# Patient Record
Sex: FEMALE | Race: WHITE | NOT HISPANIC OR LATINO | URBAN - METROPOLITAN AREA
[De-identification: names, ages, dates, MRNs, and addresses within clinical notes are randomized per-mention and may not be internally consistent; named-entity substitution may affect disease eponyms.]

---

## 2025-05-24 ENCOUNTER — OFFICE VISIT (OUTPATIENT)
Dept: URGENT CARE | Age: 45
End: 2025-05-24
Payer: COMMERCIAL

## 2025-05-24 VITALS
WEIGHT: 153 LBS | OXYGEN SATURATION: 98 % | TEMPERATURE: 97.9 F | SYSTOLIC BLOOD PRESSURE: 126 MMHG | DIASTOLIC BLOOD PRESSURE: 83 MMHG | RESPIRATION RATE: 18 BRPM | HEART RATE: 77 BPM

## 2025-05-24 DIAGNOSIS — R30.0 DYSURIA: ICD-10-CM

## 2025-05-24 DIAGNOSIS — R31.29 OTHER MICROSCOPIC HEMATURIA: Primary | ICD-10-CM

## 2025-05-24 LAB
POC APPEARANCE, URINE: CLEAR
POC BILIRUBIN, URINE: NEGATIVE
POC BLOOD, URINE: ABNORMAL
POC COLOR, URINE: YELLOW
POC GLUCOSE, URINE: NEGATIVE MG/DL
POC KETONES, URINE: NEGATIVE MG/DL
POC LEUKOCYTES, URINE: NEGATIVE
POC NITRITE,URINE: NEGATIVE
POC PH, URINE: 6 PH
POC PROTEIN, URINE: NEGATIVE MG/DL
POC SPECIFIC GRAVITY, URINE: <=1.005
POC UROBILINOGEN, URINE: 0.2 EU/DL
PREGNANCY TEST URINE, POC: NEGATIVE
URINE CULTURE EXTERNAL: NORMAL

## 2025-05-24 RX ORDER — AMOXICILLIN AND CLAVULANATE POTASSIUM 875; 125 MG/1; MG/1
875 TABLET, FILM COATED ORAL 2 TIMES DAILY
Qty: 20 TABLET | Refills: 0 | Status: SHIPPED | OUTPATIENT
Start: 2025-05-24

## 2025-05-24 RX ORDER — AZELASTINE HYDROCHLORIDE, FLUTICASONE PROPIONATE 137; 50 UG/1; UG/1
SPRAY, METERED NASAL
COMMUNITY

## 2025-05-24 RX ORDER — MINOXIDIL 2.5 MG/1
1 TABLET ORAL
COMMUNITY
Start: 2025-04-24

## 2025-05-24 RX ORDER — KETOCONAZOLE 20 MG/ML
SHAMPOO, SUSPENSION TOPICAL
COMMUNITY
Start: 2025-03-18

## 2025-05-24 RX ORDER — AZITHROMYCIN 250 MG/1
TABLET, FILM COATED ORAL
COMMUNITY
Start: 2020-03-25

## 2025-05-24 RX ORDER — CLOBETASOL PROPIONATE 0.5 MG/ML
SOLUTION TOPICAL
COMMUNITY
Start: 2025-03-18

## 2025-05-24 ASSESSMENT — ENCOUNTER SYMPTOMS: FLANK PAIN: 1

## 2025-05-24 ASSESSMENT — PAIN SCALES - GENERAL: PAINLEVEL_OUTOF10: 2

## 2025-05-24 NOTE — PROGRESS NOTES
Subjective   Patient ID: Sophie Luna is a 44 y.o. female. They present today with a chief complaint of Back Pain (Left side x 2 days).    History of Present Illness    Back Pain        44-year-old female presents today with left lower back pain x 2 days.  She endorses that she has had kidney stones in the past, and this pain feels like a dull ache, but comparable to the pain that she had prior.  She denies any abdominal pain, nausea, vomiting, diarrhea, constipation.  She would like to be assessed for a UTI, as she is leaving out of state tomorrow.  She is here for evaluation.    Past Medical History  Allergies as of 05/24/2025    (No Known Allergies)       Prescriptions Prior to Admission[1]     Medical History[2]    Surgical History[3]     reports that she has never smoked. She has never used smokeless tobacco. She reports that she does not drink alcohol and does not use drugs.    Review of Systems  Review of Systems   Genitourinary:  Positive for flank pain.                                  Objective    Vitals:    05/24/25 1141   BP: 126/83   Pulse: 77   Resp: 18   Temp: 36.6 °C (97.9 °F)   TempSrc: Oral   SpO2: 98%   Weight: 69.4 kg (153 lb)     No LMP recorded (lmp unknown). (Menstrual status: IUD).    Physical Exam  Cardiovascular:      Rate and Rhythm: Normal rate and regular rhythm.      Pulses: Normal pulses.      Heart sounds: Normal heart sounds.   Pulmonary:      Effort: Pulmonary effort is normal.      Breath sounds: Normal breath sounds.   Abdominal:      General: Bowel sounds are normal. There is no distension.      Tenderness: There is no abdominal tenderness. There is no guarding or rebound.         Procedures    Point of Care Test & Imaging Results from this visit  Results for orders placed or performed in visit on 05/24/25   POCT UA Automated manually resulted   Result Value Ref Range    POC Color, Urine Yellow Straw, Yellow, Light-Yellow    POC Appearance, Urine Clear Clear    POC Glucose,  Urine NEGATIVE NEGATIVE mg/dl    POC Bilirubin, Urine NEGATIVE NEGATIVE    POC Ketones, Urine NEGATIVE NEGATIVE mg/dl    POC Specific Gravity, Urine <=1.005 1.005 - 1.035    POC Blood, Urine MODERATE (2+) (A) NEGATIVE    POC PH, Urine 6.0 No Reference Range Established PH    POC Protein, Urine NEGATIVE NEGATIVE mg/dl    POC Urobilinogen, Urine 0.2 0.2, 1.0 EU/DL    Poc Nitrite, Urine NEGATIVE NEGATIVE    POC Leukocytes, Urine NEGATIVE NEGATIVE   POCT pregnancy, urine manually resulted   Result Value Ref Range    Preg Test, Ur Negative Negative      Imaging  No results found.    Cardiology, Vascular, and Other Imaging  No other imaging results found for the past 2 days      Diagnostic study results (if any) were reviewed by KEVON Otero.    Assessment/Plan   Allergies, medications, history, and pertinent labs/EKGs/Imaging reviewed by KEVON Otero.     Medical Decision Making  Urinalysis negative for leukocytes, but positive for blood.  Patient was agreeable to an antibiotic being sent to her pharmacy.  She stated that her pain is tolerable without any analgesics, but she has been taking Tylenol and ibuprofen as needed.  I advised her to report to the emergency department if she experiences any increased pain, and to follow-up with her primary care physician. As a result of the work-up, the patient was discharged home.  she was informed of her diagnosis and instructed to come back with any concerns or worsening of condition.  she and was agreeable to the plan as discussed above.  she was given the opportunity to ask questions.  All of the patient's questions were answered.      Orders and Diagnoses  Diagnoses and all orders for this visit:  Other microscopic hematuria  Dysuria  -     POCT UA Automated manually resulted  -     POCT pregnancy, urine manually resulted  -     Urine Culture  -     amoxicillin-clavulanate (Augmentin) 875-125 mg tablet; Take 1 tablet (875 mg) by  mouth 2 times a day.      Medical Admin Record      Patient disposition: Home    Electronically signed by KEVON Otero  12:08 PM           [1] (Not in a hospital admission)   [2]   Past Medical History:  Diagnosis Date    Personal history of other endocrine, nutritional and metabolic disease     History of hyperlipidemia   [3] History reviewed. No pertinent surgical history.

## 2025-05-26 LAB — BACTERIA UR CULT: NORMAL

## 2025-05-30 ENCOUNTER — APPOINTMENT (OUTPATIENT)
Dept: UROLOGY | Facility: CLINIC | Age: 45
End: 2025-05-30

## 2025-06-02 ENCOUNTER — APPOINTMENT (OUTPATIENT)
Dept: UROLOGY | Facility: CLINIC | Age: 45
End: 2025-06-02